# Patient Record
Sex: FEMALE | Race: BLACK OR AFRICAN AMERICAN | Employment: OTHER | URBAN - METROPOLITAN AREA
[De-identification: names, ages, dates, MRNs, and addresses within clinical notes are randomized per-mention and may not be internally consistent; named-entity substitution may affect disease eponyms.]

---

## 2017-04-05 ENCOUNTER — HOSPITAL ENCOUNTER (OUTPATIENT)
Dept: GENERAL RADIOLOGY | Age: 77
Discharge: HOME | End: 2017-04-05
Payer: MEDICARE

## 2017-04-05 DIAGNOSIS — Z01.818 PREOP TESTING: ICD-10-CM

## 2017-04-05 PROCEDURE — 71020 XR CHEST PA LAT: CPT

## 2018-05-14 ENCOUNTER — HOSPITAL ENCOUNTER (OUTPATIENT)
Dept: GENERAL RADIOLOGY | Age: 78
Discharge: HOME | End: 2018-05-14
Payer: MEDICARE

## 2018-05-14 DIAGNOSIS — I50.22 CHRONIC SYSTOLIC HEART FAILURE (HCC): ICD-10-CM

## 2018-05-14 PROCEDURE — 71046 X-RAY EXAM CHEST 2 VIEWS: CPT

## 2018-10-25 ENCOUNTER — APPOINTMENT (OUTPATIENT)
Dept: NUTRITION | Age: 78
End: 2018-10-25

## 2018-10-31 ENCOUNTER — APPOINTMENT (OUTPATIENT)
Dept: NUTRITION | Age: 78
End: 2018-10-31

## 2019-12-12 ENCOUNTER — HOSPITAL ENCOUNTER (OUTPATIENT)
Dept: LAB | Age: 79
Discharge: HOME | End: 2019-12-12
Payer: MEDICARE

## 2019-12-12 LAB
ALBUMIN SERPL-MCNC: 3.5 G/DL (ref 3.4–5)
ANION GAP SERPL CALC-SCNC: 6 MMOL/L (ref 3–18)
APPEARANCE UR: CLEAR
BACTERIA URNS QL MICRO: ABNORMAL /HPF
BILIRUB UR QL: NEGATIVE
BUN SERPL-MCNC: 25 MG/DL (ref 7–18)
BUN/CREAT SERPL: 17 (ref 12–20)
CALCIUM SERPL-MCNC: 9.1 MG/DL (ref 8.5–10.1)
CALCIUM SERPL-MCNC: 9.4 MG/DL (ref 8.5–10.1)
CHLORIDE SERPL-SCNC: 109 MMOL/L (ref 100–111)
CO2 SERPL-SCNC: 28 MMOL/L (ref 21–32)
COLOR UR: YELLOW
CREAT SERPL-MCNC: 1.47 MG/DL (ref 0.6–1.3)
EPITH CASTS URNS QL MICRO: ABNORMAL /LPF (ref 0–5)
ERYTHROCYTE [DISTWIDTH] IN BLOOD BY AUTOMATED COUNT: 14.7 % (ref 11.6–14.5)
GLUCOSE SERPL-MCNC: 85 MG/DL (ref 74–99)
GLUCOSE UR STRIP.AUTO-MCNC: NEGATIVE MG/DL
HCT VFR BLD AUTO: 36.9 % (ref 35–45)
HGB BLD-MCNC: 11.4 G/DL (ref 12–16)
HGB UR QL STRIP: NEGATIVE
KETONES UR QL STRIP.AUTO: NEGATIVE MG/DL
LEUKOCYTE ESTERASE UR QL STRIP.AUTO: ABNORMAL
MAGNESIUM SERPL-MCNC: 2.1 MG/DL (ref 1.6–2.6)
MCH RBC QN AUTO: 27.3 PG (ref 24–34)
MCHC RBC AUTO-ENTMCNC: 30.9 G/DL (ref 31–37)
MCV RBC AUTO: 88.3 FL (ref 74–97)
NITRITE UR QL STRIP.AUTO: POSITIVE
PH UR STRIP: 5.5 [PH] (ref 5–8)
PHOSPHATE SERPL-MCNC: 2.8 MG/DL (ref 2.5–4.9)
PLATELET # BLD AUTO: 222 K/UL (ref 135–420)
PMV BLD AUTO: 10.7 FL (ref 9.2–11.8)
POTASSIUM SERPL-SCNC: 4.4 MMOL/L (ref 3.5–5.5)
PROT UR STRIP-MCNC: 300 MG/DL
PTH-INTACT SERPL-MCNC: 122.1 PG/ML (ref 18.4–88)
RBC # BLD AUTO: 4.18 M/UL (ref 4.2–5.3)
RBC #/AREA URNS HPF: ABNORMAL /HPF (ref 0–5)
SODIUM SERPL-SCNC: 143 MMOL/L (ref 136–145)
SP GR UR REFRACTOMETRY: 1.01 (ref 1–1.03)
UROBILINOGEN UR QL STRIP.AUTO: 0.2 EU/DL (ref 0.2–1)
WBC # BLD AUTO: 5 K/UL (ref 4.6–13.2)
WBC URNS QL MICRO: ABNORMAL /HPF (ref 0–4)

## 2019-12-12 PROCEDURE — 87086 URINE CULTURE/COLONY COUNT: CPT

## 2019-12-12 PROCEDURE — 87186 SC STD MICRODIL/AGAR DIL: CPT

## 2019-12-12 PROCEDURE — 83735 ASSAY OF MAGNESIUM: CPT

## 2019-12-12 PROCEDURE — 36415 COLL VENOUS BLD VENIPUNCTURE: CPT

## 2019-12-12 PROCEDURE — 85027 COMPLETE CBC AUTOMATED: CPT

## 2019-12-12 PROCEDURE — 81001 URINALYSIS AUTO W/SCOPE: CPT

## 2019-12-12 PROCEDURE — 87077 CULTURE AEROBIC IDENTIFY: CPT

## 2019-12-12 PROCEDURE — 83970 ASSAY OF PARATHORMONE: CPT

## 2019-12-12 PROCEDURE — 80069 RENAL FUNCTION PANEL: CPT

## 2019-12-14 LAB
BACTERIA SPEC CULT: ABNORMAL
SERVICE CMNT-IMP: ABNORMAL

## 2020-01-20 ENCOUNTER — HOSPITAL ENCOUNTER (OUTPATIENT)
Dept: GENERAL RADIOLOGY | Age: 80
Discharge: HOME | End: 2020-01-20
Payer: MEDICARE

## 2020-01-20 DIAGNOSIS — R07.9 CHEST PAIN, UNSPECIFIED: ICD-10-CM

## 2020-01-20 PROCEDURE — 71046 X-RAY EXAM CHEST 2 VIEWS: CPT

## 2022-04-07 ENCOUNTER — HOME HEALTH ADMISSION (OUTPATIENT)
Dept: HOME HEALTH SERVICES | Facility: HOME HEALTH | Age: 82
End: 2022-04-07
Payer: MEDICARE

## 2022-04-08 ENCOUNTER — HOME CARE VISIT (OUTPATIENT)
Dept: HOME HEALTH SERVICES | Facility: HOME HEALTH | Age: 82
End: 2022-04-08

## 2022-04-10 ENCOUNTER — HOME CARE VISIT (OUTPATIENT)
Dept: SCHEDULING | Facility: HOME HEALTH | Age: 82
End: 2022-04-10
Payer: MEDICARE

## 2022-04-10 PROCEDURE — 400018 HH-NO PAY CLAIM PROCEDURE

## 2022-04-10 PROCEDURE — 3331090001 HH PPS REVENUE CREDIT

## 2022-04-10 PROCEDURE — G0151 HHCP-SERV OF PT,EA 15 MIN: HCPCS

## 2022-04-10 PROCEDURE — 400013 HH SOC

## 2022-04-10 PROCEDURE — 3331090002 HH PPS REVENUE DEBIT

## 2022-04-10 PROCEDURE — G0299 HHS/HOSPICE OF RN EA 15 MIN: HCPCS

## 2022-04-10 NOTE — CASE COMMUNICATION
SN Initial Assessment done 4/10/22. Batsheva with recent hospital stay for CHF/COPD. H/O DM and CABG, HTN, BLOOD sUGAR  MORNING ON INITIAL AS REPORTED. SN/PT/OT all ordered and accepted by patient /caregiver. Patient is Ambulatory with walker.

## 2022-04-10 NOTE — Clinical Note
Nursing Integumentary and Medication Assessment  Question ScoreM 1330  Does patient have a Stasis Ulcer  NO  Does patient have a Surgical Wound    NO     M 2001  Drug Regimen Review  NO   M 2003 Medication Follow-up  N/A  M 2010 High Risk Drug Education YES    Response codes- delete these before sending message    M 1306 Unhealed Pressure Ulcer/Injury at Stage 2 or higher  0= No       Current Number of Stage 1 Pressure Injuries  0     Stage of Most Problematic Unhealed Pressure Ulcer/Injury that is Stageable      N/A- Patient has no pressure ulcers or no stageable pressure ulcers     M 1330 Does this patient have a Stasis Ulcer? 0= No     Surgical Wound  0= No  M 2001 Did a complete drug regimen review identify potential clinically significant medication issues? 0= No- No issues found during review    M 2003 Did the agency contact a physician by midnight of the next calendar day and complete recommended actions for clinically significant medication issues? M 2010 Has the patient/caregiver received instruction on special precautions for all high-risk medications?      1= Yes

## 2022-04-10 NOTE — Clinical Note
CHF. Needs general strengthening and endurance training. Patient lives with daughter Simon Moy at this time but was living by herself in a 3 level house prior to this. Needs stair nego training.

## 2022-04-10 NOTE — HOME HEALTH
Skilled services/Home bound verification:     Skilled Reason for admission/summary of clinical condition: Female patient with diagnosis of CHF and COPD . This patient is homebound for the following reasons Requires considerable and taxing effort to leave the home . Caregiver: daughters Arsenio Butler and Phi Fischer  Caregiver assists with IADL's. Medications reconciled and all medications are available in the home this visit. High risk medication teaching regarding anticoagulants, hyperglycemic agents or opiod narcotics performed (specify) glimeperide for risk of hypoglycemia ,aspirin and plavix for risk of bleeding     Dr. Dean Sees notified of any discrepancies/look a like medications/medication interactions no severe interaction noted . Home health supplies by type and quantity ordered/delivered this visit include: none    Patient education provided this visit to include: HEP, fall prevention, dc planning . Patient level of understanding of education provided: Patient was able to partially teach back HEP     Sharps Education Provided: Clinician instructed patient/CG on proper disposal of sharps: Containers should be made of hard plastic, be puncture-resistant and leakproof,   such as a laundry detergent or bleach bottle.  When the container is ¾ full, it should be sealed with tape and labeled   DO NOT RECYCLE prior to discarding in the regular trash.      Patient response to procedure performed:  Patient needed visual cues for HEP     Home exercise program/Homework provided: patient educated with HEP including seated hip flex, knee extension, hip abduction, hip adduction, ankle PF and DF and ball squeeze x 20 reps x 3 sets daily to improve MMT on BLE to facilitate with improved bed mobility, transfers and gait with AD. patient also educated with deep breathing exercises to be done daily x 10 reps x 3 sets to prevent SOB during activity.  Patient educated with fall prevention technique by decluttering space, proper use of AD and footwear    Pt/Caregiver instructed on plan of care and are agreeable to plan of care at this time. Physician Dr. Cameron Presley notified of patient admission to home health and plan of care including anticipated frequency of 3w1 2w3 for PT   Discharge planning discussed with patient and caregiver. Discharge planning as follows: dc to family with MD supervision when all goals are met . Pt/Caregiver did verbalize understanding of discharge planning. Next MD appointment: 4/11/22  Patient/caregiver encouraged/instructed to keep appointment as lack of follow through with physician appointment could result in discontinuation of home care services for non-compliance. PMHx: CHF    COPD    CKD    HTN    DM    GERD    Chronic Pain   S: R foot pain 6/10 that is managed by rest. Patient also complained of difficulty with gait and stair negotiation   O:Patients Goals= Be able to go back to PLOF  Wound/Incision: location, description, drainage: none   PLOF: Lives in a 3 level house with  6 steps to enter main floor,15 steps to go to 2nd floor bedroom, and 10 steps to go to basement, She lives by herself prior to hospitalization and was independent inall aspects of ADL's and IADL's. Patient currently stays with daughter at this time while recoverig, was using RW for mobility. STRENGTH B hip flexor, extensor, hip abductors, adductors 3+/5, B knee flexor and extensor 3/5  FTSTS 39 seconds  TUG 65 seconds   BALANCE  Tinetti 11/28 high fall risk due to reduced strength on BLE, gait deviation and decreased efficiency of balance reactions. Patient demonstrated poor use of hip and ankle strategy during standing balance activity. Patient requires support from AD at all times for safety and stability. GAIT Patient ambulated with RW x 20 feet with Mod A x1 with noted slow paced, forward stooped posture and decreased bilateral feet clearance.  Patient wa educated with heel to toe gait pattern technique to prevent LOB and falls   BED MOBILITY Patient needed Neftali x1 with bed mobility   TRANSFERS Patient needed Mod A x1 using RW to and from bed, chair and toilet   A: PT evaluation completed with the presence of  sarah Lopez who is one of the primary CG, POC established, Med rec done, HEP established  P:Home Safety eval/recommendations: Home health physical therapy initial evaluation performed. Patient demonstrates decreased strength, balance, and endurance which increases patient's overall fall risk and burden of care. Patient would benefit from home health physical therapy to improve balance, strength, and endurance which would decrease fall risk and allow patient to return to prior level of function once all functional goals or full rehab potential is met. patient educated with HEP including seated hip flex, knee extension, hip abduction, hip adduction, ankle PF and DF and ball squeeze x 20 reps x 3 sets daily to improve MMT on BLE to facilitate with improved bed mobility, transfers and gait with AD. patient also educated with deep breathing exercises to be done daily x 10 reps x 3 sets to prevent SOB during activity.  Patient educated with fall prevention technique by decluttering space, proper use of AD and footwear

## 2022-04-11 VITALS
OXYGEN SATURATION: 97 % | RESPIRATION RATE: 18 BRPM | TEMPERATURE: 97.4 F | DIASTOLIC BLOOD PRESSURE: 70 MMHG | HEART RATE: 76 BPM | SYSTOLIC BLOOD PRESSURE: 130 MMHG

## 2022-04-11 VITALS
SYSTOLIC BLOOD PRESSURE: 130 MMHG | RESPIRATION RATE: 16 BRPM | HEART RATE: 80 BPM | OXYGEN SATURATION: 99 % | TEMPERATURE: 97.8 F | DIASTOLIC BLOOD PRESSURE: 72 MMHG

## 2022-04-11 PROCEDURE — 3331090001 HH PPS REVENUE CREDIT

## 2022-04-11 PROCEDURE — 3331090002 HH PPS REVENUE DEBIT

## 2022-04-11 NOTE — HOME HEALTH
Caregiver involvement:Daughter Calderon is her daughter, she is caregiver, she  Assists with ADLs, Medication management, Transportation to appointments, Meal prep and assists with ambulation. Medications reconciled and all medications are available in the home this visit. Medications  are effective at this time. Home health supplies by type and quantity ordered/delivered this visit include: n/a  Patient is on Plavix and ASA, I discussed the signs of santos and occult bleeding, prevention of bleeding when taking anticoagulants. and when to call MD.       Patient education provided this visit:the importance of regular blood sugar monitoring for good blood sugar control. Pt instructed to follow with diabetic diet- monitoring sugar intake, limiting foods with high sugar content. Patient is a fall risk, went over the need of having someone with hier when ambulating, keep hallways and living areas free of clutter and throw rugs. MEDICATION ADHERENCE IS AN IMPORTANT COMPONENT OF HTN MANAGEMENT. PATIENT STATES THE IMPORTANCE TO TAKE HTN MEDS SAME TIME EACH DAY. Continue a heart Healthy ADA diet. Watch out for high sodium foods, read labels. Observe for signs of infection. Monitor B/P, take meds as ordered and f/u with PCP. Read labels of foods, stay away from high sodium canned foods and processed meats. Discussed the importance of staying well hydrated with water 6-8 glasses a day. I also went over what CHF is and the importance of diet and medication compliance. We discussed the need for daily weights to observe for increase in fluid retention and to call MD if > 2 lb weight gain in 24 hours. Progress toward goals: Patient ambulating with walker, MARIE after ambulating 10-20 feet. Home exercise program: Discussed s/s of infection to monitor for, s/s of UTI, who to report to/when. Instructed cg to notify staff/md/seek tx if complications occur.    Patient instructed to maintain clear pathways in home and to minimize clutter to prevent falls from occurring/minimize fall potential.   Patient needing a well balanced diet with the 5 food groups, patient to increase fiber in diet, fresh fruits and vegetables, whole grains and increase water intake. Maintain healthy low sodium ADA diet, Continue to monitor B/P and Blood sugars, Observe for signs of infection. Work with PT to increase strength and f/u with PCP. I went over the importance of taking all prescriptions as ordered. I discussed how to avoid extra sodium in her diet. We discussed the signs of infection and when to call MD.  We discussed the high risk for falls and ways to prevent falls in the future. We discussed taking B/P daily and keeping a log. We also discussed the need of a heart healthy ADA diet, and the need to change positions frequently. Gaining strength with PT for increased mobility. Dr. Kat English notified of SN Initial Assessment. Continued need for the following skills: Nursing, Physical Therapy and Occupational Therapy    The following discharge planning was discussed with the pt/caregiver: Will discharge from nursing when education is complete and patient is medically stable.

## 2022-04-12 ENCOUNTER — HOME CARE VISIT (OUTPATIENT)
Dept: SCHEDULING | Facility: HOME HEALTH | Age: 82
End: 2022-04-12
Payer: MEDICARE

## 2022-04-12 VITALS
RESPIRATION RATE: 18 BRPM | OXYGEN SATURATION: 98 % | TEMPERATURE: 97.9 F | SYSTOLIC BLOOD PRESSURE: 132 MMHG | HEART RATE: 72 BPM | DIASTOLIC BLOOD PRESSURE: 75 MMHG

## 2022-04-12 VITALS
OXYGEN SATURATION: 96 % | SYSTOLIC BLOOD PRESSURE: 128 MMHG | HEART RATE: 74 BPM | DIASTOLIC BLOOD PRESSURE: 76 MMHG | TEMPERATURE: 97.6 F | RESPIRATION RATE: 18 BRPM

## 2022-04-12 PROCEDURE — 3331090001 HH PPS REVENUE CREDIT

## 2022-04-12 PROCEDURE — G0300 HHS/HOSPICE OF LPN EA 15 MIN: HCPCS

## 2022-04-12 PROCEDURE — G0157 HHC PT ASSISTANT EA 15: HCPCS

## 2022-04-12 PROCEDURE — 3331090002 HH PPS REVENUE DEBIT

## 2022-04-12 NOTE — HOME HEALTH
Skilled reason for visit: COPD, Diabetes, Hypertension, Cardiac Disease, Chronic Kidney Disease, and Anxiety    Caregiver involvement:  Patients daughter Ida Serrano prepares her meals, schedules her appointments, assist with transporting her to appointments, and assist with toileting and bathing if needed. [facility] sends patients medications to her via mail order. Medications reviewed and all medications ARE available in the home this visit. The following education was provided regarding medications: Bumex-Patient/Caregiver was educated on this medication and the purpose of it. Patient/Caregiver verbalized understanding. MD notified of any discrepancies/look a-like medications/medication interactions: NA    Medications are EFFECTIVE at this time. Home health supplies by type and quantity ordered/delivered this visit include: NA    Patient education provided this visit: Patient was educated on items checked in interventions tab. Sharps education provided: Clinician instructed patient/CG on proper disposal of sharps: Containers should be made of hard plastic, be puncture-resistant and leak proof, such as a laundry detergent or bleach bottle.  When the container is ¾ full, it should be sealed with tape and labeled DO NOT RECYCLE prior to discarding in the regular trash.      Patient level of understanding of education provided: Patient verbalized all understanding in interventions tab. Skilled Care Performed this visit: Disease and Medication Education and Management    Patient response to procedure performed: Patient tolerated vital assessment well and no facial grimaces or complaints of pain or discomfort. Patient was sitting on her bed answering question that this nurse was asking her. Agency Progress toward goals:  Agency staff is progressing well with patient as patient verbalizes being able to better manage her disease processes with the education received from home care staff.     Patient's Progress towards personal goals:  Progressing towards all goals in interventions tab. Home exercise program: Patient will start weighing herself daily and record weights in notebook. Continued need for the following skills: Nursing will continue to follow patient until education is understood and able to be verbalized by patient/caregiver. Plan for next visit:Continue Education on Disease Process and Medications    Patient and/or caregiver notified and agrees to changes in the Plan of Care NA      The following discharge planning was discussed with the pt/caregiver: when patient reaches goals and medication is managed, and disease processes are understood patient agrees and understand that discharge will take place.

## 2022-04-12 NOTE — HOME HEALTH
SUBJECTIVE: Patient feels good this morning and had no c/o pain. CAREGIVER INVOLVEMENT/ASSISTANCE NEEDED FOR: Patient's daughter, Hali López, assists with transportation, ADLS, and IADLS. HOME HEALTH SUPPLIES BY TYPE AND QUANTITY ORDERED/DELIVERED THIS VISIT INCLUDE: None needed for this visit. OBJECTIVE:  See interventions. PATIENT RESPONSE TO TREATMENT: After walking, SpO2 = 98% and HR=87 bpm. Patient was short of breath after walking but recovered after resting 1 minute. After compleating seated CHF/COPD Protocol Exs, SpO2=97% and HR=82 bpm.  PATIENT LEVEL OF UNDERSTANDING OF EDUCATION PROVIDED: Patient verbalized understanding on pain management technique, medication, foot care, fall prevention, pressure relief,   ASSESSMENT OF PROGRESS TOWARD GOALS: Patient is progressing toward goals for gait and transfers. Pt previously needed Mod A to ambulate safely but progressed to CGA for gait training today using FWW. Patient ambulated with decreased evelyn, decreased foot clearance, and forward flexed trunk. Gave multiple vc's for pt to lift B foot higher to clear floor safely and to maintain erect posture. Pt needs reinforcement for safety with gait and to improve posture. Pt also previously needed Mod A to perform sit < > stand transfers but progressed to [id number] for transfer training today. Gave vc's for pt to lean forward and to push off using B UE to stand up. Pt needs reinforcement to improve transfer technique. In addition, instructed pt on performing COPD/CHF Protocol Exercises #1-4 for HEP. CONTINUED NEED FOR THE FOLLOWING SKILLS: Gait Training, Transfer Training, Stairs, and Therapeutic Exercises. PLAN FOR NEXT VISIT: Patient will be seen 1w1 2w3 to increase safety with gait, to improve transfer technique, and to increase strength of B LE. DISCHARGE PLANNING DISCUSSED WITH PATIENT/CAREGIVER: Discharge patient to family with MD supervision when all goals are met.  Pt/Caregiver did verbalize understanding of discharge planning.

## 2022-04-13 ENCOUNTER — HOME CARE VISIT (OUTPATIENT)
Dept: SCHEDULING | Facility: HOME HEALTH | Age: 82
End: 2022-04-13
Payer: MEDICARE

## 2022-04-13 VITALS
OXYGEN SATURATION: 96 % | SYSTOLIC BLOOD PRESSURE: 126 MMHG | DIASTOLIC BLOOD PRESSURE: 78 MMHG | HEART RATE: 81 BPM | TEMPERATURE: 97.2 F | RESPIRATION RATE: 16 BRPM

## 2022-04-13 PROCEDURE — 3331090002 HH PPS REVENUE DEBIT

## 2022-04-13 PROCEDURE — G0300 HHS/HOSPICE OF LPN EA 15 MIN: HCPCS

## 2022-04-13 PROCEDURE — 3331090001 HH PPS REVENUE CREDIT

## 2022-04-13 NOTE — HOME HEALTH
Skilled reason for visit: COPD, Diabetes, Hypertension, Cardiac Disease, Chronic Kidney Disease, and Anxiety    Caregiver involvement:  Patients daughter Nayan Looney prepares her meals, schedules her appointments, assist with transporting her to appointments, and assist with toileting and bathing if needed. [facility] sends patients medications to her via mail order. Medications reviewed and all medications ARE available in the home this visit. The following education was provided regarding medications:Amaryl -Patient/Caregiver was educated on this medication and the purpose of it. Patient/Caregiver verbalized understanding. MD notified of any discrepancies/look a-like medications/medication interactions: NA    Medications are EFFECTIVE at this time. Home health supplies by type and quantity ordered/delivered this visit include: NA    Patient education provided this visit: Patient was educated on items checked in interventions tab. Sharps education provided: Clinician instructed patient/CG on proper disposal of sharps: Containers should be made of hard plastic, be puncture-resistant and leak proof, such as a laundry detergent or bleach bottle. When the container is ¾ full, it should be sealed with tape and labeled DO NOT RECYCLE prior to discarding in the regular trash. Patient level of understanding of education provided: Patient verbalized all understanding in interventions tab. Skilled Care Performed this visit: Disease and Medication Education and Management    Patient response to procedure performed: Patient tolerated vital assessment well and no facial grimaces or complaints of pain or discomfort. Patient was sitting on her bed answering question that this nurse was asking her. Agency Progress toward goals:  Agency staff is progressing well with patient as patient verbalizes being able to better manage her disease processes with the education received from home care staff.     Patient's Progress towards personal goals:  Progressing towards all goals in interventions tab. Home exercise program: Patient will elevate her legs and feet throughout the day when sitting up in wheelchair. Continued need for the following skills: Nursing will continue to follow patient until education is understood and able to be verbalized by patient/caregiver. Plan for next visit:Continue Education on Disease Process and Medications    Patient and/or caregiver notified and agrees to changes in the Plan of Care NA      The following discharge planning was discussed with the pt/caregiver: when patient reaches goals and medication is managed, and disease processes are understood patient agrees and understand that discharge will take place.

## 2022-04-14 ENCOUNTER — HOME CARE VISIT (OUTPATIENT)
Dept: SCHEDULING | Facility: HOME HEALTH | Age: 82
End: 2022-04-14
Payer: MEDICARE

## 2022-04-14 VITALS
OXYGEN SATURATION: 97 % | HEART RATE: 77 BPM | SYSTOLIC BLOOD PRESSURE: 145 MMHG | DIASTOLIC BLOOD PRESSURE: 77 MMHG | RESPIRATION RATE: 18 BRPM | TEMPERATURE: 98.3 F

## 2022-04-14 PROCEDURE — G0157 HHC PT ASSISTANT EA 15: HCPCS

## 2022-04-14 PROCEDURE — 3331090002 HH PPS REVENUE DEBIT

## 2022-04-14 PROCEDURE — 3331090001 HH PPS REVENUE CREDIT

## 2022-04-15 PROCEDURE — 3331090001 HH PPS REVENUE CREDIT

## 2022-04-15 PROCEDURE — 3331090002 HH PPS REVENUE DEBIT

## 2022-04-15 NOTE — HOME HEALTH
SUBJECTIVE: Patient feels good this morning and had no c/o pain. CAREGIVER INVOLVEMENT/ASSISTANCE NEEDED FOR: Patient's daughter, Sher Denise, assists with transportation, ADLS, and IADLS. HOME HEALTH SUPPLIES BY TYPE AND QUANTITY ORDERED/DELIVERED THIS VISIT INCLUDE: None needed for this visit. OBJECTIVE:  See interventions. PATIENT RESPONSE TO TREATMENT: After walking, SpO2 = 98% and HR=80 bpm. Patient was short of breath after walking but recovered after resting in her wheelchair for 45 seconds. After completing CHF/COPD Protocol Exs 1-10, SpO2=98% and HR=77 bpm.  PATIENT LEVEL OF UNDERSTANDING OF EDUCATION PROVIDED: Patient verbalized understanding on pain management technique, medication, foot care, fall prevention, pressure relief,   ASSESSMENT OF PROGRESS TOWARD GOALS: Patient is progressing toward goals for gait and transfers. Pt previously needed CGA to ambulate safely but progressed to SBA for gait training today using FWW. Patient ambulated with decreased evelyn, decreased foot clearance, and forward flexed trunk. Gave multiple vc's for pt to lift B foot higher to clear floor safely and to maintain erect posture. Pt needs reinforcement for safety with gait and to improve posture. Pt also previously needed CGA to perform sit < > stand transfers but progressed to SBA for transfer training today from her wheelchair. Gave vc's for pt to lean forward and to push off using B UE to stand up. Pt needs reinforcement to improve transfer technique. In addition, instructed pt on performing COPD/CHF Protocol Exercises #1-10 for HEP. Patient and pt's daughter verbalized understanding for HEP. CONTINUED NEED FOR THE FOLLOWING SKILLS: Gait Training, Transfer Training, Stairs, and Therapeutic Exercises. PLAN FOR NEXT VISIT: Patient will be seen 2w3 to increase safety with gait, to improve transfer technique, and to increase strength of B LE.   DISCHARGE PLANNING DISCUSSED WITH PATIENT/CAREGIVER: Discharge patient to family with MD supervision when all goals are met. Pt/Caregiver did verbalize understanding of discharge planning.

## 2022-04-16 ENCOUNTER — HOME CARE VISIT (OUTPATIENT)
Dept: HOME HEALTH SERVICES | Facility: HOME HEALTH | Age: 82
End: 2022-04-16
Payer: MEDICARE

## 2022-04-16 PROCEDURE — 3331090002 HH PPS REVENUE DEBIT

## 2022-04-16 PROCEDURE — 3331090001 HH PPS REVENUE CREDIT

## 2022-04-17 PROCEDURE — 3331090002 HH PPS REVENUE DEBIT

## 2022-04-17 PROCEDURE — 3331090001 HH PPS REVENUE CREDIT

## 2022-04-18 ENCOUNTER — HOME CARE VISIT (OUTPATIENT)
Dept: SCHEDULING | Facility: HOME HEALTH | Age: 82
End: 2022-04-18
Payer: MEDICARE

## 2022-04-18 VITALS
TEMPERATURE: 97.5 F | WEIGHT: 164.6 LBS | DIASTOLIC BLOOD PRESSURE: 64 MMHG | BODY MASS INDEX: 31.1 KG/M2 | HEART RATE: 71 BPM | SYSTOLIC BLOOD PRESSURE: 122 MMHG | RESPIRATION RATE: 16 BRPM | OXYGEN SATURATION: 95 %

## 2022-04-18 PROCEDURE — G0300 HHS/HOSPICE OF LPN EA 15 MIN: HCPCS

## 2022-04-18 PROCEDURE — G0152 HHCP-SERV OF OT,EA 15 MIN: HCPCS

## 2022-04-18 PROCEDURE — 3331090002 HH PPS REVENUE DEBIT

## 2022-04-18 PROCEDURE — 3331090001 HH PPS REVENUE CREDIT

## 2022-04-18 NOTE — HOME HEALTH
Skilled reason for visit: COPD, Diabetes, Hypertension, Cardiac Disease, Chronic Kidney Disease, and Anxiety    Caregiver involvement:  Patients daughter Bill Castro prepares her meals, schedules her appointments, assist with transporting her to appointments, and assist with toileting and bathing if needed. [facility] sends patients medications to her via mail order. Medications reviewed and all medications ARE available in the home this visit. The following education was provided regarding medications:Reglan-Patient/Caregiver was educated on this medication and the purpose of it. Patient/Caregiver verbalized understanding. MD notified of any discrepancies/look a-like medications/medication interactions: NA    Medications are EFFECTIVE at this time. Home health supplies by type and quantity ordered/delivered this visit include: NA    Patient education provided this visit: Patient was educated on items checked in interventions tab. Sharps education provided: Clinician instructed patient/CG on proper disposal of sharps: Containers should be made of hard plastic, be puncture-resistant and leak proof, such as a laundry detergent or bleach bottle. When the container is ¾ full, it should be sealed with tape and labeled DO NOT RECYCLE prior to discarding in the regular trash. Patient level of understanding of education provided: Patient verbalized all understanding in interventions tab. Skilled Care Performed this visit: Disease and Medication Education and Management    Patient response to procedure performed: Patient tolerated vital assessment well and no facial grimaces or complaints of pain or discomfort. Patient was sitting on her bed answering question that this nurse was asking her. Agency Progress toward goals:  Agency staff is progressing well with patient as patient verbalizes being able to better manage her disease processes with the education received from home care staff.     Patient's Progress towards personal goals:  Progressing towards all goals in interventions tab. Home exercise program: Patient will be sure to weigh first think on the morning after waking up and using the bathroom, but before eating breakfast and bathing. Patient and daughter verbalized understanding. Continued need for the following skills: Nursing will continue to follow patient until education is understood and able to be verbalized by patient/caregiver. Plan for next visit:Continue Education on Disease Process and Medications    Patient and/or caregiver notified and agrees to changes in the Plan of Care NA      The following discharge planning was discussed with the pt/caregiver: when patient reaches goals and medication is managed, and disease processes are understood patient agrees and understand that discharge will take place.

## 2022-04-18 NOTE — Clinical Note
dx: CHF/COPD, Anemia, zip: [phone], freq: 2w3, 1w1, focus: increased independence ADL tasks - pt would like to return home where she was living alone and performing all ADL tasks with I, increased safety ambulation, transfers, improved balance for fall prevention, independence light meal prep, increased functional endurance. thank you.

## 2022-04-19 ENCOUNTER — HOME CARE VISIT (OUTPATIENT)
Dept: SCHEDULING | Facility: HOME HEALTH | Age: 82
End: 2022-04-19
Payer: MEDICARE

## 2022-04-19 VITALS
SYSTOLIC BLOOD PRESSURE: 153 MMHG | TEMPERATURE: 98 F | OXYGEN SATURATION: 97 % | RESPIRATION RATE: 16 BRPM | HEART RATE: 70 BPM | DIASTOLIC BLOOD PRESSURE: 80 MMHG

## 2022-04-19 VITALS
OXYGEN SATURATION: 97 % | SYSTOLIC BLOOD PRESSURE: 107 MMHG | RESPIRATION RATE: 16 BRPM | DIASTOLIC BLOOD PRESSURE: 60 MMHG | HEART RATE: 74 BPM | TEMPERATURE: 98.9 F

## 2022-04-19 PROCEDURE — 3331090001 HH PPS REVENUE CREDIT

## 2022-04-19 PROCEDURE — G0157 HHC PT ASSISTANT EA 15: HCPCS

## 2022-04-19 PROCEDURE — 3331090002 HH PPS REVENUE DEBIT

## 2022-04-19 NOTE — HOME HEALTH
Clinical Condition per EPIC:  \"List of Comorbidities:   CHF   COPD   CKD   HTN   DM   GERD   Chronic Pain\"  . SUBJECTIVE:  Pt stated \"My foot hurts me but I think It's because of the weather. \" pt sitting WC upon OT arrival. pt agreed tx. pt currently staying at daughters house however reports would like to return home. CAREGIVER INVOLVEMENT: pt daughter provides A ADL tasks, ambulation, and transfers PRN, performs IADL tasks, shopping, transportation, and A with medications. MEDICATION RECONCILIATION: OT reconcilled medications with pt with no changes   DME ORDERED/RECOMMENEDED: NA, pt has FWW, BSC, WC, and adjustable bed. .  OBJECTIVE:  BATHING: pt performs bathing sitting BSC at bathroom sink with mod A per daughter A due to impaired balance. TOILETING: min A   UB DRESSING: min A  LB DRESSING: mod A due to impaired balance  GROOMING: SBA for safety while standing   FEEDING: I  .  pt reports Modified Sunny RPE 5/10 after performing ambulation, transfers, and ADL assessment. .  OT instructed/demonstrated pt the following with good understanding:    - energy conservation while performing bathing, dressing, and setup such as set clothing out night before, gather items required to perform task in one trip, sit while performing tasks, take rest breaks as needed, perform shower/bathing on days with no other appointments or activities scheduled, etc.     - pt is to perform bathing/dressing tasks sitting, when possible, for safety/fall prevention.     - while sitting perform weight shift technique bringing LE contra laterally onto opposite LE while performing LB bathing/dressing for safety and fall prevention. 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 - pt instructed/demonstrated one handed technique while standing to perform functional tasks with use rollator walker/grab bar for increased stability, fall prevention, and safety while standing.     -ADL training performed for improved body mechanics, safety, and technique for reduced risk of falls and improved functional level and participation of tasks. Jamal Wheatlo IADL: NT. pt would like to address light meal prep POC. Jamal Castaneda BALANCE:  STATIC STANDING: fair-  DYNAMIC STANDING: poor+  . AMBULATION: pt performed ambulation FWW min v/c increased foot clearance, toe height, and stride length for safety/fall prevention. pt with fair follow through. .  EOB/BED TRANSFER: pt supine to/from sit with HOB elevated SBA, sit/stand EOB SBA min v/c technique and positioning on bed prior to performing transfer for safety. WC: SBA use BUE push off and reach back prior to sitting for safety/fall prevention. TOILET/BSC from over commode: SBA min v/c technique and use BUE reach back for safety/fall prevention. TUB SHOWER: NT. pt did not perform bathing via tub shower prior to illness therefore declined transfer. .  -gait and transfer training performed for improved body mechanics and technique for fall prevention and safety while performing ADL/IADL tasks. Jamal Castaneda PATIENT RESPONSE TO TREATMENT:  pt reports no increased pain or discomfort with activity throughout tx. PATIENT EDUCATION PROVIDED THIS VISIT: UB HEP, OT role, energy conservation, fall prevention/safety training ADL/IADL tasks, transfer training   . PATIENT LEVEL OF UNDERSTANDING OF EDUCATION PROVIDED: pt verbalized good understanding energy conservation, UB HEP, and transfer techniques. REHAB POTENTIAL: good for stated goals   HOME EXERCISE PROGRAM: Written HEP of the following issued. WAN will instruct/re-instruct pt next treatment. UB HEP includes the following: BUE shoulder press, shoulder flexion, shoulder abduction, shoulder extension, chest press, elbow flexion/extension x 10, x 2 per day. pt aware WAN will update HEP throughout POC.  UB HEP performed for pt increased endurance and strength to perform ADL/IADL tasks and ambulation/transfers to perform tasks with improved safety, increased functional level, and for fall prevention. CONTINUED NEED FOR THE FOLLOWING SKILLS: HH OT is medically necessary to address pain, decreased functional strength, increased swelling, impaired bed mobility to perform ADL tasks, decreased independence and safety with functional transfers, decreased independence and safety performing ADL/IADL tasks, decreased activity and standing tolerance, decreased functional endurance, and impaired balance in order to improve functional independence, obtain set goals, reduce risk of falls, reduce pain, improve quality of life, and return to PLOF. ASSESSMENT: pt presents with decreased endurance, decreased functional strength, decreased safety transfers, increased A with transfers, decreased safety/participation ADL/IADL tasks, decreased balance, increased burden of care, and no UB HEP. PLAN: pt will be seen to address ther ex: establish and upgrade HEP, ther act: activity and standing tolerance training, endurance training, balance training, safety training, energy conservation training, transfer training, ADL/IADL training, and pt/caregiver education. DISCHARGE PLANNING DISCUSSED WITH PT/CAREGIVER: Anticipate D/C skilled OT week of 5.9.22 when goals met or when pt obtained maximum benefit. pt frequency 2w3, 1w1. discharge to self and family care under MD supervision once all goals have been met or patient has reached max potential. pt and caregiver verbalized understanding and agree POC.

## 2022-04-20 PROCEDURE — 3331090001 HH PPS REVENUE CREDIT

## 2022-04-20 PROCEDURE — 3331090002 HH PPS REVENUE DEBIT

## 2022-04-21 ENCOUNTER — HOME CARE VISIT (OUTPATIENT)
Dept: SCHEDULING | Facility: HOME HEALTH | Age: 82
End: 2022-04-21
Payer: MEDICARE

## 2022-04-21 VITALS
HEART RATE: 79 BPM | DIASTOLIC BLOOD PRESSURE: 67 MMHG | RESPIRATION RATE: 18 BRPM | SYSTOLIC BLOOD PRESSURE: 130 MMHG | TEMPERATURE: 98.2 F | OXYGEN SATURATION: 99 %

## 2022-04-21 VITALS
OXYGEN SATURATION: 97 % | WEIGHT: 167.4 LBS | RESPIRATION RATE: 18 BRPM | BODY MASS INDEX: 31.63 KG/M2 | SYSTOLIC BLOOD PRESSURE: 122 MMHG | HEART RATE: 87 BPM | DIASTOLIC BLOOD PRESSURE: 80 MMHG | TEMPERATURE: 97.5 F

## 2022-04-21 PROCEDURE — 3331090001 HH PPS REVENUE CREDIT

## 2022-04-21 PROCEDURE — G0157 HHC PT ASSISTANT EA 15: HCPCS

## 2022-04-21 PROCEDURE — G0300 HHS/HOSPICE OF LPN EA 15 MIN: HCPCS

## 2022-04-21 PROCEDURE — 3331090002 HH PPS REVENUE DEBIT

## 2022-04-21 NOTE — HOME HEALTH
SUBJECTIVE: Patient reported that she has been walking around a lot but has not been exercising. CAREGIVER INVOLVEMENT/ASSISTANCE NEEDED FOR: Patient's daughter, Grady Bennett, assists with transportation, ADLS, and IADLS. HOME HEALTH SUPPLIES BY TYPE AND QUANTITY ORDERED/DELIVERED THIS VISIT INCLUDE: None needed for this visit. OBJECTIVE:  See interventions. PATIENT RESPONSE TO TREATMENT: Pt was short of breath after walking but SpO2=98% and HR=86 bpm. After bed mobility training, SpO2=98% and HR=82 bpm.  PATIENT LEVEL OF UNDERSTANDING OF EDUCATION PROVIDED: Patient verbalized understanding on pain management technique, medication, diabetic foot care, fall prevention, and pressure relief. ASSESSMENT OF PROGRESS TOWARD GOALS: Patient is progressing toward goals for gait and bed mobility. Pt previously needed SBA to ambulate safely but progressed to close S for gait training today using FWW. Patient ambulated with decreased evelyn, decreased foot clearance, and forward flexed trunk. Gave multiple vc's for pt to lift B foot higher to clear floor safely and to maintain erect posture. Pt needs reinforcement for safety with gait and to improve posture. Pt also previously needed Min A to get in and out of bed and to scoot L < > R in bed but progressed to I for bed mobility training today. Pt needed extra time and was short of breath but demonstrated good technique for bed mobility. In addition, instructed pt on performing CHF/COPD Protocol Exercises #1-14 except #13. CONTINUED NEED FOR THE FOLLOWING SKILLS: Gait Training, Transfer Training, Stairs, and Therapeutic Exercises. PLAN FOR NEXT VISIT: Patient will be seen 2w2 to increase safety with gait, to improve transfer technique, and to increase strength of B LE. DISCHARGE PLANNING DISCUSSED WITH PATIENT/CAREGIVER: Discharge patient to family with MD supervision when all goals are met. Pt/Caregiver did verbalize understanding of discharge planning.

## 2022-04-21 NOTE — HOME HEALTH
Skilled reason for visit: COPD, Diabetes, Hypertension, Cardiac Disease, Chronic Kidney Disease, and Anxiety    Caregiver involvement:  Patients daughter Lidia Bradley prepares her meals, schedules her appointments, assist with transporting her to appointments, and assist with toileting and bathing if needed. [facility]ila sends patients medications to her via mail order. Medications reviewed and all medications ARE available in the home this visit. The following education was provided regarding medications: Synthroid-Patient/Caregiver was educated on this medication and the purpose of it. Patient/Caregiver verbalized understanding. MD notified of any discrepancies/look a-like medications/medication interactions: NA    Medications are EFFECTIVE at this time. Home health supplies by type and quantity ordered/delivered this visit include: NA    Patient education provided this visit: Patient was educated on items checked in interventions tab. Sharps education provided: Clinician instructed patient/CG on proper disposal of sharps: Containers should be made of hard plastic, be puncture-resistant and leak proof, such as a laundry detergent or bleach bottle. When the container is ¾ full, it should be sealed with tape and labeled DO NOT RECYCLE prior to discarding in the regular trash. Patient level of understanding of education provided: Patient verbalized all understanding in interventions tab. Skilled Care Performed this visit: Disease and Medication Education and Management    Patient response to procedure performed: Patient tolerated vital assessment well and no facial grimaces or complaints of pain or discomfort. Patient was sitting on her bed answering question that this nurse was asking her. Agency Progress toward goals:  Agency staff is progressing well with patient as patient verbalizes being able to better manage her disease processes with the education received from home care staff.     Patient's Progress towards personal goals:  Progressing towards all goals in interventions tab. Home exercise program: Patient will drink plenty of fluids to stay hydrated and prevent dehydration. .    Continued need for the following skills: Nursing will continue to follow patient until education is understood and able to be verbalized by patient/caregiver. Plan for next visit:Continue Education on Disease Process and Medications    Patient and/or caregiver notified and agrees to changes in the Plan of Care NA      The following discharge planning was discussed with the pt/caregiver: when patient reaches goals and medication is managed, and disease processes are understood patient agrees and understand that discharge will take place.

## 2022-04-22 ENCOUNTER — HOME CARE VISIT (OUTPATIENT)
Dept: SCHEDULING | Facility: HOME HEALTH | Age: 82
End: 2022-04-22
Payer: MEDICARE

## 2022-04-22 VITALS
DIASTOLIC BLOOD PRESSURE: 66 MMHG | SYSTOLIC BLOOD PRESSURE: 138 MMHG | TEMPERATURE: 97.4 F | HEART RATE: 73 BPM | OXYGEN SATURATION: 95 %

## 2022-04-22 VITALS
RESPIRATION RATE: 18 BRPM | OXYGEN SATURATION: 96 % | HEART RATE: 68 BPM | BODY MASS INDEX: 31.37 KG/M2 | TEMPERATURE: 97.6 F | DIASTOLIC BLOOD PRESSURE: 64 MMHG | SYSTOLIC BLOOD PRESSURE: 118 MMHG | WEIGHT: 166 LBS

## 2022-04-22 PROCEDURE — G0158 HHC OT ASSISTANT EA 15: HCPCS

## 2022-04-22 PROCEDURE — 3331090002 HH PPS REVENUE DEBIT

## 2022-04-22 PROCEDURE — 3331090001 HH PPS REVENUE CREDIT

## 2022-04-22 PROCEDURE — G0300 HHS/HOSPICE OF LPN EA 15 MIN: HCPCS

## 2022-04-22 NOTE — HOME HEALTH
Skilled reason for visit: COPD, Diabetes, Hypertension, Cardiac Disease, Chronic Kidney Disease, and Anxiety    Caregiver involvement:  Patients daughter Alejandro Vines prepares her meals, schedules her appointments, assist with transporting her to appointments, and assist with toileting and bathing if needed. [facility] sends patients medications to her via mail order. Medications reviewed and all medications ARE available in the home this visit. The following education was provided regarding medications: Isordil-Patient/Caregiver was educated on this medication and the purpose of it. Patient/Caregiver verbalized understanding. MD notified of any discrepancies/look a-like medications/medication interactions: NA    Medications are EFFECTIVE at this time. Home health supplies by type and quantity ordered/delivered this visit include: NA    Patient education provided this visit: Patient was educated on items checked in interventions tab. Sharps education provided: Clinician instructed patient/CG on proper disposal of sharps: Containers should be made of hard plastic, be puncture-resistant and leak proof, such as a laundry detergent or bleach bottle. When the container is ¾ full, it should be sealed with tape and labeled DO NOT RECYCLE prior to discarding in the regular trash. Patient level of understanding of education provided: Patient verbalized all understanding in interventions tab. Skilled Care Performed this visit: Disease and Medication Education and Management    Patient response to procedure performed: Patient tolerated vital assessment well and no facial grimaces or complaints of pain or discomfort. Patient was sitting in her wheelchair in the living room answering questions that this nurse was asking her.     Agency Progress toward goals:  Agency staff is progressing well with patient as patient verbalizes being able to better manage her disease processes with the education received from home care staff. Patient's Progress towards personal goals:  Progressing towards all goals in interventions tab. Home exercise program: Patient will continue to weigh and monitor blood sugars as ordered as she has been doing great with keeping a record of readings. Continued need for the following skills: Nursing will continue to follow patient until education is understood and able to be verbalized by patient/caregiver. Plan for next visit:Continue Education on Disease Process and Medications    Patient and/or caregiver notified and agrees to changes in the Plan of Care NA      The following discharge planning was discussed with the pt/caregiver: when patient reaches goals and medication is managed, and disease processes are understood patient agrees and understand that discharge will take place.

## 2022-04-22 NOTE — HOME HEALTH
SUBJECTIVE: Upon arrival pt was seated in wc; reported no pain and recently finished breakfast. Pt stated she stays with her daughter and would like to return home once she is stronger beacuse she wants to be independent at home. Pt daughter reported she does not bathe in tub perfers sponge bathing at sink sitting on 3in1. Pt reported she can dress her self including performing jabari care but her daughter does it for her because she is \"too slow\" performing tasks; pt reported she makes her own coffee and her daughter prepares her meals. 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 CAREGIVER ASSISTANCE NEEDED FOR: Pts daughter present at time of treatment for education and to assist with pts care. MEDICATIONS REVIEWED AND UPDATED: No changes in medications reported at time of visit. .  OBJECTIVE: see interventions  PATIENT RESPONSE TO TREATMENT:  Pt demonstrated a positive response to treatment with no increase in pain and good participation during HEP and transfers. Delphine Napier PATIENT/CAREGIVER EDUCATION PROVIDED THIS VISIT: CG educated on nayana technique for LB dressing to increased I and safety; safety in bathroom to not use door knob but instead to use sink counter and walk laterally until door frame; to increase safety with transfer from sit<>  bed to place FWW closer to bed. PATIENT LEVEL OF UNDERSTANDING OF EDUCATION PROVIDED: Pt demonstrated good understanding of education by verbalizing teachback of nayana technique for LB dressing, teachback with exiting the bathroom holding countertop and verbalizing she will move clothing basket to place FWW closer to bed. ASSESSMENT AND PROGRESS TOWARD GOALS: Pt demonstrated good progress towards goals with increasing safety and I with good demonstration of transfers, standing balance and HEP. Patient will benefit from continued intervention with progression of I/ADLs, functional mobility and transfers.    CONTINUED NEED FOR THE FOLLOWING SKILLS: HH OT is medically necessary to address decreased ROM, decreased strength, decreased independence with functional transfers, decreased I with I/ADLs, and impaired balance in order to improve functional independence, quality of life, return to PLOF, reduce the risk for falls, and reduce pain. PLAN: Next visit will address pt bathing and clothing management. DISCHARGE PLANNING DISCUSSED: Discharge to self and family under MD supervision once all goals have been met or patient has reached maximum potential.  Frequency remaininw2, 1w1 remaining.

## 2022-04-23 PROCEDURE — 3331090002 HH PPS REVENUE DEBIT

## 2022-04-23 PROCEDURE — 3331090001 HH PPS REVENUE CREDIT

## 2022-04-24 PROCEDURE — 3331090002 HH PPS REVENUE DEBIT

## 2022-04-24 PROCEDURE — 3331090001 HH PPS REVENUE CREDIT

## 2022-04-25 ENCOUNTER — HOME CARE VISIT (OUTPATIENT)
Dept: SCHEDULING | Facility: HOME HEALTH | Age: 82
End: 2022-04-25
Payer: MEDICARE

## 2022-04-25 VITALS
DIASTOLIC BLOOD PRESSURE: 68 MMHG | SYSTOLIC BLOOD PRESSURE: 128 MMHG | OXYGEN SATURATION: 95 % | WEIGHT: 166 LBS | HEART RATE: 78 BPM | TEMPERATURE: 97.4 F | BODY MASS INDEX: 31.37 KG/M2 | RESPIRATION RATE: 16 BRPM

## 2022-04-25 VITALS
RESPIRATION RATE: 20 BRPM | SYSTOLIC BLOOD PRESSURE: 137 MMHG | DIASTOLIC BLOOD PRESSURE: 65 MMHG | HEART RATE: 68 BPM | TEMPERATURE: 97.4 F | OXYGEN SATURATION: 97 %

## 2022-04-25 PROCEDURE — 3331090002 HH PPS REVENUE DEBIT

## 2022-04-25 PROCEDURE — G0157 HHC PT ASSISTANT EA 15: HCPCS

## 2022-04-25 PROCEDURE — G0300 HHS/HOSPICE OF LPN EA 15 MIN: HCPCS

## 2022-04-25 PROCEDURE — 3331090001 HH PPS REVENUE CREDIT

## 2022-04-25 NOTE — HOME HEALTH
SUBJECTIVE: Patient reported poor follow-up with HEP. CAREGIVER INVOLVEMENT/ASSISTANCE NEEDED FOR: Patient's daughter, Valdemar Brittle, assists with transportation, ADLS, and IADLS. HOME HEALTH SUPPLIES BY TYPE AND QUANTITY ORDERED/DELIVERED THIS VISIT INCLUDE: None needed for this visit. OBJECTIVE:  See interventions. PATIENT RESPONSE TO TREATMENT: Pt was short of breath after walking and exercising, but O2 Saturation stayed WNL. PATIENT LEVEL OF UNDERSTANDING OF EDUCATION PROVIDED: Patient verbalized understanding on pain management technique, medication, diabetic foot care, fall prevention, and pressure relief. ASSESSMENT OF PROGRESS TOWARD GOALS: Patient is progressing toward goals for gait and transfers. Pt previously needed close S to ambulate safely but progressed to Supervision for gait training today using FWW inside her home. Patient ambulated with decreased step length, decreased foot clearance, and forward flexed trunk. Gave multiple vc's for pt to take longer steps, to lift B foot higher to clear floor safely, and to maintain erect posture. Pt needs reinforcement for safety with gait, to improve gait, and to improve posture. Pt also previously needed SBA to perform sit < > stand transfers but progressed to Supervision for transfer training today. Gave vc's for pt to lean forward and to push off using B UE to stand up. Pt demonstrated improved transfer technique after giving verbal cues. In addition, patient performed 1 set of CHF/COPD Protocol Exercises #1-14 except #13. Discussed with patient/CG plan to discharge pt from [facility] PT services next week. Pt/CG verbalized understanding and is in agreement with discharge plan. CONTINUED NEED FOR THE FOLLOWING SKILLS: Gait Training, Transfer Training, Stairs, and Therapeutic Exercises. PLAN FOR NEXT VISIT: Patient will be seen 1w1 2w1 to increase safety with gait, to improve transfer technique, and to increase strength of B LE.   DISCHARGE PLANNING DISCUSSED WITH PATIENT/CAREGIVER: Discharge patient to family with MD supervision when all goals are met. Pt/Caregiver did verbalize understanding of discharge planning.

## 2022-04-26 PROCEDURE — 3331090001 HH PPS REVENUE CREDIT

## 2022-04-26 PROCEDURE — 3331090002 HH PPS REVENUE DEBIT

## 2022-04-27 ENCOUNTER — HOME CARE VISIT (OUTPATIENT)
Dept: SCHEDULING | Facility: HOME HEALTH | Age: 82
End: 2022-04-27
Payer: MEDICARE

## 2022-04-27 VITALS
OXYGEN SATURATION: 98 % | RESPIRATION RATE: 16 BRPM | HEART RATE: 94 BPM | TEMPERATURE: 97.7 F | DIASTOLIC BLOOD PRESSURE: 81 MMHG | SYSTOLIC BLOOD PRESSURE: 159 MMHG

## 2022-04-27 PROCEDURE — G0151 HHCP-SERV OF PT,EA 15 MIN: HCPCS

## 2022-04-27 PROCEDURE — G0158 HHC OT ASSISTANT EA 15: HCPCS

## 2022-04-27 PROCEDURE — 3331090002 HH PPS REVENUE DEBIT

## 2022-04-27 PROCEDURE — 3331090001 HH PPS REVENUE CREDIT

## 2022-04-27 NOTE — HOME HEALTH
SUBJECTIVE: I went to the doctors yesteday and they did not change any of my mediacations. I am going to have a scope on my stomach in May  REQUIRES CAREGIVER ASSISTANCE FOR: transportation, medications, ADLS, IADLS   MEDICATIONS REVIEWED AND RECONCILED no changes   NEXT MD APPT:  22   ROM:B LE WFL   STRENGTH: B hip flexor, quads and hamstrings 5/5  5x sit to stand  29.37 sec   WOUNDS:none  BED MOBILITY:supine to sit and sit to supine MOD I   TRANSFERS:sit to stand from wc x3 MOD I with B UE support MOD , sit to stand from bed x 6 MOD I with and without BUE support for push off. GAIT: Pt amb with RW with S with reciprocal gait pattern with decresed B step length with decreased B HS with fwd flexed posture. O2 sat was 96% after amb with HR of 86. Per PTA visit 22  Patient progressed to ambulating 100' inside her home using FWW Supervision to promote increased safety and improved stability using LRAD when ambulating. Patient ambulated with decreased step length, decreased foot clearance, and forward flexed trunk. Gave multiple vc's for pt to take longer steps, to lift B foot higher to clear floor safely, and to maintain erect posture. Pt needs reinforcement for safety with gait, to improve gait, and to improve posture. STAIRS Per PTA visit 22 Patient progressed to negotiating up and down 4 stairs + 1 using handrail CGA to promote safety when entering and exiting her home in preparation for MD appointments and community ambulation. Pt led with her L LE to go up and down stairs. Pt needed extra time and verbal cues to perform pursed-lip breathing when negotiating stairs. BALANCE: Pt scored 18/28 on Tinetti Balance Assessment placing pt at high  risk for falls.   TU.28  PATIENT RESPONE TO TX: Pt pain level remained the same throughut tx session  PATIENT LEVEL OF UNDERSTANDING OF EDUCATION PROVIDED Pt demonstated and verbalized use of RW at all times when walking for safety   PATIENT EDUCATION PROVIDED THIS VISIT: safety, HEP, walking, deep breathing,      HEP consisting of:  1. Walking every hour during the daytime for 3-5 minutes using FWW.    2. CHF/COPD Protocol Exercises #1-14 (NOT 13 & 15) x 10 reps each, 3x/day. Written HEP issued, patient/caregiver verbalized understanding. CONTINUED NEED FOR THE FOLLOWING SKILLS: HH PT is medically necessary to  address , impaired gait, impaired stair negotiation, and impaired balance in order to improve functional independence, quality of life, return to PLOF, and reduce the risk for falls. ASSESSMENT: Pt has made progress in therapy. On SOC strength was B hip flexor, extensor, hip abductors, adductors 3+/5, B knee flexor and extensor 3/5. FTSTS 39 seconds. Today on Re Assessment strength is  B hip flexor, quads and hamstrings 5/5  5x sit to stand  29.37 sec. On [facility] bed mobility was Patient needed Neftali x1 with bed mobility  Today at Re Assessment bed mobility is supine to sit and sit to supine MOD I. goal has been met. On [facility] transfers were Patient needed Mod A x1 using RW to and from bed, chair and toilet . Today at Re Assessment transfers are sit to stand from wc x3 MOD I with B UE support MOD , sit to stand from bed x 6 MOD I with and without BUE support for push off. goal has been met. On SOC gait was  Patient ambulated with RW x 20 feet with Mod A x1 with noted slow paced, forward stooped posture and decreased bilateral feet clearance. Patient wa educated with heel to toe gait pattern technique to prevent LOB and falls  Today at Re Assessment gait is Pt amb with RW with S with reciprocal gait pattern with decresed B step length with decreased B HS with fwd flexed posture. O2 sat was 96% after amb with HR of 86. Per PTA visit 4/25/22  Patient progressed to ambulating 100' inside her home using FWW Supervision to promote increased safety and improved stability using LRAD when ambulating.  Patient ambulated with decreased step length, decreased foot clearance, and forward flexed trunk. Gave multiple vc's for pt to take longer steps, to lift B foot higher to clear floor safely, and to maintain erect posture. Pt needs reinforcement for safety with gait, to improve gait, and to improve posture. . On SOC stairs were na. Per PTA visit 4/21/22 Patient progressed to negotiating up and down 4 stairs + 1 using handrail CGA to promote safety when entering and exiting her home in preparation for MD appointments and community ambulation. Pt led with her L LE to go up and down stairs. Pt needed extra time and verbal cues to perform pursed-lip breathing when negotiating stairs. .On [facility] Tinetti 11/28 high fall risk due to reduced strength on BLE, gait deviation and decreased efficiency of balance reactions. Patient demonstrated poor use of hip and ankle strategy during standing balance activity. Patient requires support from AD at all times for safety and stability. TUG 65 seconds. Today at Re Assessment  pt scored a 18/28 on Tinetti Balance Assessment placing pt as a high fall risk. Pt did have more than a 4 point statistical improvement. Pt would cont to benefit from HHPT services to cont to work on stair training, gait training on flat and uenven surfaces working on distance and endunace and cont with dynamic standing balance re education all to increased pt functional I and return pt to PLOF   PLAN: stair training, gait training on flat level and uneven surfaces. dynamic standing balance re education  DISCHARGE PLANNING DISCUSSED: Discharge to self and family under MD supervision once all goals have been met or patient has reached max potential. Patient/caregiver verbalized understanding.

## 2022-04-28 ENCOUNTER — HOME CARE VISIT (OUTPATIENT)
Dept: HOME HEALTH SERVICES | Facility: HOME HEALTH | Age: 82
End: 2022-04-28
Payer: MEDICARE

## 2022-04-28 VITALS
WEIGHT: 166 LBS | BODY MASS INDEX: 31.37 KG/M2 | DIASTOLIC BLOOD PRESSURE: 78 MMHG | OXYGEN SATURATION: 97 % | RESPIRATION RATE: 17 BRPM | HEART RATE: 79 BPM | TEMPERATURE: 97.8 F | SYSTOLIC BLOOD PRESSURE: 142 MMHG

## 2022-04-28 VITALS
TEMPERATURE: 97.7 F | HEART RATE: 73 BPM | OXYGEN SATURATION: 97 % | SYSTOLIC BLOOD PRESSURE: 138 MMHG | DIASTOLIC BLOOD PRESSURE: 60 MMHG | RESPIRATION RATE: 16 BRPM

## 2022-04-28 PROCEDURE — 3331090002 HH PPS REVENUE DEBIT

## 2022-04-28 PROCEDURE — G0300 HHS/HOSPICE OF LPN EA 15 MIN: HCPCS

## 2022-04-28 PROCEDURE — 3331090001 HH PPS REVENUE CREDIT

## 2022-04-28 NOTE — HOME HEALTH
SUBJECTIVE: Upon arrival was seated in w/c; pt stated MD appointment went well yesterday and MD plans to schedule for stomach scope in the future. Pt stated since OT last visit she had been performing her bathing at sink level at S level with AE and with rest breaks Pt stated she receives pre-mademeals from [employer] and she warms them up for lunch without any assistance. Pt demonstrated a lack of carryover of HEP and therapist re-educated pt on HEP. CAREGIVER ASSISTANCE NEEDED FOR: pts daughter home during visit and available if needed for education and scheduling. MEDICATIONS REVIEWED AND UPDATED: no medication changes reported this visit. .  OBJECTIVE: see interventions  PATIENT RESPONSE TO TREATMENT:  Pt demonstrated positive response to treatment with no increased pain reported and good participation. Derick Miner PATIENT/CAREGIVER EDUCATION PROVIDED THIS VISIT: Pt re-educated in on HEP to help increase UB strength and endurance. PATIENT LEVEL OF UNDERSTANDING OF EDUCATION PROVIDED: Pt demonstrated an understanding of education with good teachback of of HEP. ASSESSMENT AND PROGRESS TOWARD GOALS:  Pt demonstrated good progress towards goals with increased safety and I with IADLs, transfers and UB HEP. Patient will benefit from continued intervention with progression of I/ADL training and assessment for increased I and use of energy conservation techniques to reduce fatigue. CONTINUED NEED FOR THE FOLLOWING SKILLS: HH OT is medically necessary to address decreased strength, impaired bed mobility, decreased independence with functional transfers, decreased I with I/ADLs, and impaired balance in order to improve functional independence, quality of life, return to PLOF, reduce the risk for falls, and reduce pain.   PLAN: next visit will be for ADL assessment of sponge bathing in bathroom for increased safety and I.    DISCHARGE PLANNING DISCUSSED: Discharge to self and family under MD supervision once all goals have been met or patient has reached maximum potential.  Frequency remaininw1, 2w1,1w1 remaining.

## 2022-04-29 ENCOUNTER — HOME CARE VISIT (OUTPATIENT)
Dept: SCHEDULING | Facility: HOME HEALTH | Age: 82
End: 2022-04-29
Payer: MEDICARE

## 2022-04-29 PROCEDURE — G0158 HHC OT ASSISTANT EA 15: HCPCS

## 2022-04-29 PROCEDURE — 3331090002 HH PPS REVENUE DEBIT

## 2022-04-29 PROCEDURE — 3331090001 HH PPS REVENUE CREDIT

## 2022-04-30 PROCEDURE — 3331090001 HH PPS REVENUE CREDIT

## 2022-04-30 PROCEDURE — 3331090002 HH PPS REVENUE DEBIT

## 2022-05-01 VITALS
DIASTOLIC BLOOD PRESSURE: 46 MMHG | TEMPERATURE: 97.2 F | SYSTOLIC BLOOD PRESSURE: 142 MMHG | HEART RATE: 78 BPM | RESPIRATION RATE: 16 BRPM | OXYGEN SATURATION: 97 %

## 2022-05-01 PROCEDURE — 3331090002 HH PPS REVENUE DEBIT

## 2022-05-01 PROCEDURE — 3331090001 HH PPS REVENUE CREDIT

## 2022-05-01 NOTE — HOME HEALTH
SUBJECTIVE: Upon arrival pt was in the bathroom; once she exited the bathroom pt stated she was tired and did not want to bath this morning as previously arranged. Pt stated no current pain but some chest pain last night. Pt and CG reported pt has been performing her own sponge bath at sink requiring assistance to wash her back, bottom and feet. Pt fatigued during mock bathing rushing through process and returning to bedroom to sit EOB; CG reported pt is usually fatigued after bathing. Pt CG said pt can do her own UB dressing and CG helps with socks and threading pants on feet. Therapist recommended pt performs bathing on 3in1 to help reduce fatigue during bathing. CAREGIVER ASSISTANCE NEEDED FOR: Pts daughter present at time of treatment for education and to assist with pts care. MEDICATIONS REVIEWED AND UPDATED: No changes in medications reported at time of visit. .  OBJECTIVE: see interventions  PATIENT RESPONSE TO TREATMENT:  Pt demonstrated a fair response to treatment with no increase in pain and fair participation of mock ADLs. Tae Neighbours PATIENT/CAREGIVER EDUCATION PROVIDED THIS VISIT: Pt educated on safety techniques to increase I and reduce fatigue during sponge bathing by sitting on 3in1 to perform all bathing and stand when needed; educated on the importance of practicing static stance at [facility] to increase endurance and strength. PATIENT LEVEL OF UNDERSTANDING OF EDUCATION PROVIDED: Pt and CG demonstrated good understanding of education verbalizing pt will sit during ADLs to reduce fatigue and practice static stance during commercial breaks to increase endurance. ASSESSMENT AND PROGRESS TOWARD GOALS: Pt demonstrated good progress towards goals with increased safety and I during transfer. Patient will benefit from continued intervention with progression of ADLs, clothing management and meal prep to increase I and pts goal to return home without help.   CONTINUED NEED FOR THE FOLLOWING SKILLS: [place] OT is medically necessary to address, decreased ROM, decreased strength, decreased independence with functional transfers, decreased I with I/ADLs, and impaired balance in order to improve functional independence, quality of life, return to PLOF, reduce the risk for falls, and reduce pain. PLAN: Next visit will address safety and technique during bathing and dressing. DISCHARGE PLANNING DISCUSSED: Discharge to self and family under MD supervision once all goals have been met or patient has reached maximum potential.  Frequency remaininw1, 1w1 remaining.

## 2022-05-02 ENCOUNTER — HOME CARE VISIT (OUTPATIENT)
Dept: SCHEDULING | Facility: HOME HEALTH | Age: 82
End: 2022-05-02
Payer: MEDICARE

## 2022-05-02 VITALS
OXYGEN SATURATION: 95 % | HEART RATE: 74 BPM | SYSTOLIC BLOOD PRESSURE: 134 MMHG | TEMPERATURE: 98.5 F | DIASTOLIC BLOOD PRESSURE: 62 MMHG | RESPIRATION RATE: 18 BRPM

## 2022-05-02 PROCEDURE — 3331090001 HH PPS REVENUE CREDIT

## 2022-05-02 PROCEDURE — G0157 HHC PT ASSISTANT EA 15: HCPCS

## 2022-05-02 PROCEDURE — 3331090002 HH PPS REVENUE DEBIT

## 2022-05-03 ENCOUNTER — HOME CARE VISIT (OUTPATIENT)
Dept: SCHEDULING | Facility: HOME HEALTH | Age: 82
End: 2022-05-03
Payer: MEDICARE

## 2022-05-03 PROCEDURE — 3331090002 HH PPS REVENUE DEBIT

## 2022-05-03 PROCEDURE — G0158 HHC OT ASSISTANT EA 15: HCPCS

## 2022-05-03 PROCEDURE — 3331090001 HH PPS REVENUE CREDIT

## 2022-05-04 ENCOUNTER — HOME CARE VISIT (OUTPATIENT)
Dept: SCHEDULING | Facility: HOME HEALTH | Age: 82
End: 2022-05-04
Payer: MEDICARE

## 2022-05-04 VITALS
TEMPERATURE: 97.9 F | SYSTOLIC BLOOD PRESSURE: 132 MMHG | RESPIRATION RATE: 16 BRPM | HEART RATE: 91 BPM | OXYGEN SATURATION: 97 % | DIASTOLIC BLOOD PRESSURE: 62 MMHG

## 2022-05-04 VITALS
SYSTOLIC BLOOD PRESSURE: 126 MMHG | RESPIRATION RATE: 12 BRPM | TEMPERATURE: 96.6 F | DIASTOLIC BLOOD PRESSURE: 65 MMHG | OXYGEN SATURATION: 99 % | HEART RATE: 83 BPM

## 2022-05-04 PROCEDURE — 3331090001 HH PPS REVENUE CREDIT

## 2022-05-04 PROCEDURE — 3331090002 HH PPS REVENUE DEBIT

## 2022-05-04 PROCEDURE — G0151 HHCP-SERV OF PT,EA 15 MIN: HCPCS

## 2022-05-04 NOTE — Clinical Note
thank you   ----- Message -----  From: Melinda Jones, PT  Sent: 5/4/2022   3:05 PM EDT  To: RUMA Alfaro/L      Patient is s/p CHF/COPD  and has been treated for  strengthening, gait training, stair training, HEP training, safety training, and balance training. On SOC strength was B hip flexor, extensor, hip abductors, adductors 3+/5, B knee flexor and extensor 3/5. FTSTS 39 seconds. On  Re Assessment 4/27/22  strength is  B hip flexor, quads and hamstrings 5/5  5x sit to stand  29.37 sec. goals has been met. On [facility] bed mobility was Patient needed Neftali x1 with bed mobility  On Re Assessment bed mobility is supine to sit and sit to supine MOD I. goal has been met. On [facility] transfers were Patient needed Mod A x1 using RW to and from bed, chair and toilet . On Re Assessment  transfers are sit to stand from wc x3 MOD I with B UE support MOD , sit to stand from bed x 6 MOD I with and without BUE support for push off. goal has been met. On SOC gait was  Patient ambulated with RW x 20 feet with Mod A x1 with noted slow paced, forward stooped posture and decreased bilateral feet clearance. Patient wa educated with heel to toe gait pattern technique to prevent LOB and falls  On Re Assessment gait is Pt amb with RW with S with reciprocal gait pattern with decresed B step length with decreased B HS with fwd flexed posture. O2 sat was 96% after amb with HR of 86. Per PTA visit 4/25/22  Patient progressed to ambulating 100' inside her home using FWW Supervision to promote increased safety and improved stability using LRAD when ambulating. Patient ambulated with decreased step length, decreased foot clearance, and forward flexed trunk. Gave multiple vc's for pt to take longer steps, to lift B foot higher to clear floor safely, and to maintain erect posture. Pt needs reinforcement for safety with gait, to improve gait, and to improve posture. .Today at [place] gait is Pt amb household distances with RW with reciprocal gait patten MOD I pt did have an antalgic gait pattern secondary to R foot. decreased R HS noted at inital contact and decreased B step length noted. No balance checks noted with amb Per PTA visit 5/2/22 Patient progressed to ambulating 200' outside over uneven surfaces using FWW Supervision to promote increased safety and improved stability using LRAD when ambulating. Patient ambulated with improved step length, but with decreased foot clearance and forward flexed trunk. Gave multiple vc's for pt to lift B foot higher to clear floor safely and to maintain erect posture. Pt verbalized understanding. On [facility] stairs were na. Per PTA visit 4/21/22 Patient progressed to negotiating up and down 4 stairs + 1 using handrail CGA to promote safety when entering and exiting her home in preparation for MD appointments and community ambulation. Pt led with her L LE to go up and down stairs. Pt needed extra time and verbal cues to perform pursed-lip breathing when negotiating stairs Per PTA visit 5/2/22 Patient progressed to negotiating up and down 4 stairs + 1 using handrail Supervision to promote safety when entering and exiting her home in preparation for MD appointments and community ambulation. Pt took her time and demonstrated improved ability to negotiate stairs safely. On [facility] Tinetti 11/28 high fall risk due to reduced strength on BLE, gait deviation and decreased efficiency of balance reactions. Patient demonstrated poor use of hip and ankle strategy during standing balance activity. Patient requires support from AD at all times for safety and stability. TUG 65 seconds. On Re Assessment  pt scored a 18/28 on Tinetti Balance Assessment placing pt as a high fall risk. Pt did have more than a 4 point statistical improvement. Today at [place] TUG is 38. 75 sec Pt has made progress and has met all goals. Discharge plan: Discharge from [facility] services as all goals have been met.   Dr Anahi Pitts has been notified of [place] from HHPT with goals met

## 2022-05-04 NOTE — HOME HEALTH
SUBJECTIVE: Upon arrival pt opened the door with FWW; reported no pain, pt had previously agreed to full sponge bathing but stated she bathed at sink prior to visit and it went well so was only agreeable to mock demo. Pt stated she feels safe bathing and dressing. Pt reported she went home over the weekend and walked up/down 15 steps. Pt demonstrated improved static stance unsupported for 1+ minute and dynamic stance during ADLs with intermittent use of countertop and increased stability. CAREGIVER ASSISTANCE NEEDED FOR: Pt daughter present but did not participate in treatment. MEDICATIONS REVIEWED AND UPDATED: No changes in medications at time of visit. .  OBJECTIVE: see interventions  PATIENT RESPONSE TO TREATMENT:  Pt demonstrated a good response to treatment with no increase in pain and good participation. Marquis Ortiz PATIENT/CAREGIVER EDUCATION PROVIDED THIS VISIT: Pt educated on dressing in bathroom on 3in1 or on EOB to increase stability and reduce SOB with attempting in stance. PATIENT LEVEL OF UNDERSTANDING OF EDUCATION PROVIDED: Pt demonstrated understanding of education by verbalizing she could try to dress in the bathroom and showed improved activity tolerance when using 3:1 for energy conservation during ADL demo. ASSESSMENT AND PROGRESS TOWARD GOALS: Pt demonstrated good progress towards goals with increased safety and I with bathing and UB/LB dressing. Patient will benefit from continued intervention with progression of static standing balance tolerance and IADL assessment to increase I and meet goal of returning home. CONTINUED NEED FOR THE FOLLOWING SKILLS: HH OT is medically necessary to address decreased ROM, decreased strength, impaired bed mobility, decreased independence with functional transfers, decreased I with I/ADLs, and impaired balance in order to improve functional independence, quality of life, return to PLOF, reduce the risk for falls, and reduce pain.   PLAN: Next visit will address light meal prep to increase I and safety in kitchen at [facility] level. DISCHARGE PLANNING DISCUSSED: Discharge to self and family under MD supervision once all goals have been met or patient has reached maximum potential.  Frequency remaininw2 remaining with pt aware of OTDC next week.

## 2022-05-04 NOTE — Clinical Note
Patient is s/p CHF/COPD  and has been treated for  strengthening, gait training, stair training, HEP training, safety training, and balance training. On SOC strength was B hip flexor, extensor, hip abductors, adductors 3+/5, B knee flexor and extensor 3/5. FTSTS 39 seconds. On  Re Assessment 4/27/22  strength is  B hip flexor, quads and hamstrings 5/5  5x sit to stand  29.37 sec. goals has been met. On [facility] bed mobility was Patient needed Neftali x1 with bed mobility  On Re Assessment bed mobility is supine to sit and sit to supine MOD I. goal has been met. On [facility] transfers were Patient needed Mod A x1 using RW to and from bed, chair and toilet . On Re Assessment  transfers are sit to stand from wc x3 MOD I with B UE support MOD , sit to stand from bed x 6 MOD I with and without BUE support for push off. goal has been met. On SOC gait was  Patient ambulated with RW x 20 feet with Mod A x1 with noted slow paced, forward stooped posture and decreased bilateral feet clearance. Patient wa educated with heel to toe gait pattern technique to prevent LOB and falls  On Re Assessment gait is Pt amb with RW with S with reciprocal gait pattern with decresed B step length with decreased B HS with fwd flexed posture. O2 sat was 96% after amb with HR of 86. Per PTA visit 4/25/22  Patient progressed to ambulating 100' inside her home using FWW Supervision to promote increased safety and improved stability using LRAD when ambulating. Patient ambulated with decreased step length, decreased foot clearance, and forward flexed trunk. Gave multiple vc's for pt to take longer steps, to lift B foot higher to clear floor safely, and to maintain erect posture. Pt needs reinforcement for safety with gait, to improve gait, and to improve posture. .Today at [place] gait is Pt amb household distances with RW with reciprocal gait patten MOD I pt did have an antalgic gait pattern secondary to R foot.  decreased R HS noted at inital contact and decreased B step length noted. No balance checks noted with amb Per PTA visit 5/2/22 Patient progressed to ambulating 200' outside over uneven surfaces using FWW Supervision to promote increased safety and improved stability using LRAD when ambulating. Patient ambulated with improved step length, but with decreased foot clearance and forward flexed trunk. Gave multiple vc's for pt to lift B foot higher to clear floor safely and to maintain erect posture. Pt verbalized understanding. On [facility] stairs were na. Per PTA visit 4/21/22 Patient progressed to negotiating up and down 4 stairs + 1 using handrail CGA to promote safety when entering and exiting her home in preparation for MD appointments and community ambulation. Pt led with her L LE to go up and down stairs. Pt needed extra time and verbal cues to perform pursed-lip breathing when negotiating stairs Per PTA visit 5/2/22 Patient progressed to negotiating up and down 4 stairs + 1 using handrail Supervision to promote safety when entering and exiting her home in preparation for MD appointments and community ambulation. Pt took her time and demonstrated improved ability to negotiate stairs safely. On [facility] Tinetti 11/28 high fall risk due to reduced strength on BLE, gait deviation and decreased efficiency of balance reactions. Patient demonstrated poor use of hip and ankle strategy during standing balance activity. Patient requires support from AD at all times for safety and stability. TUG 65 seconds. On Re Assessment  pt scored a 18/28 on Tinetti Balance Assessment placing pt as a high fall risk. Pt did have more than a 4 point statistical improvement. Today at [place] TUG is 38. 75 sec Pt has made progress and has met all goals. Discharge plan: Discharge from [facility] services as all goals have been met.   Dr Randee Gaston has been notified of [place] from [address] with goals met

## 2022-05-04 NOTE — HOME HEALTH
SUBJECTIVE: My R foot my arthritis is bothering me   REQUIRES CAREGIVER ASSISTANCE FOR: transportation, medications, IADLS   MEDICATIONS REVIEWED AND RECONCILED no changes   NEXT MD APPT: ,5/11/22   ROM: B LE WFL   STRENGTH: B hip flexors, quads and hamstrings 5/5   WOUNDS:none  R foot 1 non pitting edema noted no warmth or redness noted   BED MOBILITY:supine to sit and sit to supine MOD I   TRANSFERS:sit to stand from wc x 3  with B UE support MOD I   GAIT: Pt amb household distances with RW with reciprocal gait patten MOD I pt did have an antalgic gait pattern secondary to R foot. decreased R HS noted at inital contact and decreased B step length noted. No balance checks noted with amb Per PTA visit 5/2/22 Patient progressed to ambulating 200' outside over uneven surfaces using FWW Supervision to promote increased safety and improved stability using LRAD when ambulating. Patient ambulated with improved step length, but with decreased foot clearance and forward flexed trunk. Gave multiple vc's for pt to lift B foot higher to clear floor safely and to maintain erect posture. Pt verbalized understanding. STAIRS:Per PTA visit 5/2/22 Patient progressed to negotiating up and down 4 stairs + 1 using handrail Supervision to promote safety when entering and exiting her home in preparation for MD appointments and community ambulation. Pt took her time and demonstrated improved ability to negotiate stairs safely  BALANCE: TUG 38.75 sec   PATIENT RESPONE TO TX: Pt pain level remained the same throughout tx session   PATIENT LEVEL OF UNDERSTANDING OF EDUCATION PROVIDED Pt demonstrated and verbalized use of RW at all times when amb for safety   PATIENT EDUCATION PROVIDED THIS VISIT: safety, HEP, walking, deep breathing,     HEP consisting of:  1. Walking every hour during the daytime for 3-5 minutes using FWW.    2. CHF/COPD Protocol Exercises #1-14 (NOT 13 & 15) x 10 reps each, 3x/day.   Written HEP issued, patient/caregiver verbalized understanding. Patient is s/p CHF/COPD  and has been treated for  strengthening, gait training, stair training, HEP training, safety training, and balance training. On SOC strength was B hip flexor, extensor, hip abductors, adductors 3+/5, B knee flexor and extensor 3/5. FTSTS 39 seconds. On  Re Assessment 4/27/22  strength is  B hip flexor, quads and hamstrings 5/5  5x sit to stand  29.37 sec. goals has been met. On [facility] bed mobility was Patient needed Neftali x1 with bed mobility  On Re Assessment bed mobility is supine to sit and sit to supine MOD I. goal has been met. On [facility] transfers were Patient needed Mod A x1 using RW to and from bed, chair and toilet . On Re Assessment  transfers are sit to stand from wc x3 MOD I with B UE support MOD , sit to stand from bed x 6 MOD I with and without BUE support for push off. goal has been met. On SOC gait was  Patient ambulated with RW x 20 feet with Mod A x1 with noted slow paced, forward stooped posture and decreased bilateral feet clearance. Patient wa educated with heel to toe gait pattern technique to prevent LOB and falls  On Re Assessment gait is Pt amb with RW with S with reciprocal gait pattern with decresed B step length with decreased B HS with fwd flexed posture. O2 sat was 96% after amb with HR of 86. Per PTA visit 4/25/22  Patient progressed to ambulating 100' inside her home using FWW Supervision to promote increased safety and improved stability using LRAD when ambulating. Patient ambulated with decreased step length, decreased foot clearance, and forward flexed trunk. Gave multiple vc's for pt to take longer steps, to lift B foot higher to clear floor safely, and to maintain erect posture. Pt needs reinforcement for safety with gait, to improve gait, and to improve posture. .Today at [place] gait is Pt amb household distances with RW with reciprocal gait patten MOD I pt did have an antalgic gait pattern secondary to R foot. decreased R HS noted at inital contact and decreased B step length noted. No balance checks noted with amb Per PTA visit 5/2/22 Patient progressed to ambulating 200' outside over uneven surfaces using FWW Supervision to promote increased safety and improved stability using LRAD when ambulating. Patient ambulated with improved step length, but with decreased foot clearance and forward flexed trunk. Gave multiple vc's for pt to lift B foot higher to clear floor safely and to maintain erect posture. Pt verbalized understanding. On [facility] stairs were na. Per PTA visit 4/21/22 Patient progressed to negotiating up and down 4 stairs + 1 using handrail CGA to promote safety when entering and exiting her home in preparation for MD appointments and community ambulation. Pt led with her L LE to go up and down stairs. Pt needed extra time and verbal cues to perform pursed-lip breathing when negotiating stairs Per PTA visit 5/2/22 Patient progressed to negotiating up and down 4 stairs + 1 using handrail Supervision to promote safety when entering and exiting her home in preparation for MD appointments and community ambulation. Pt took her time and demonstrated improved ability to negotiate stairs safely. On [facility] Tinetti 11/28 high fall risk due to reduced strength on BLE, gait deviation and decreased efficiency of balance reactions. Patient demonstrated poor use of hip and ankle strategy during standing balance activity. Patient requires support from AD at all times for safety and stability. TUG 65 seconds. On Re Assessment  pt scored a 18/28 on Tinetti Balance Assessment placing pt as a high fall risk. Pt did have more than a 4 point statistical improvement. Today at [place] TUG is 38. 75 sec Pt has made progress and has met all goals. Discharge plan: Discharge from [facility] services as all goals have been met.   Dr Kerri Hauser has been notified of [place] from [address] with goals met

## 2022-05-05 ENCOUNTER — HOME CARE VISIT (OUTPATIENT)
Dept: SCHEDULING | Facility: HOME HEALTH | Age: 82
End: 2022-05-05
Payer: MEDICARE

## 2022-05-05 PROCEDURE — 3331090002 HH PPS REVENUE DEBIT

## 2022-05-05 PROCEDURE — 3331090001 HH PPS REVENUE CREDIT

## 2022-05-05 PROCEDURE — G0158 HHC OT ASSISTANT EA 15: HCPCS

## 2022-05-06 VITALS
DIASTOLIC BLOOD PRESSURE: 72 MMHG | RESPIRATION RATE: 17 BRPM | SYSTOLIC BLOOD PRESSURE: 158 MMHG | OXYGEN SATURATION: 96 % | HEART RATE: 76 BPM | TEMPERATURE: 98.2 F

## 2022-05-06 PROCEDURE — 3331090002 HH PPS REVENUE DEBIT

## 2022-05-06 PROCEDURE — 3331090001 HH PPS REVENUE CREDIT

## 2022-05-06 NOTE — HOME HEALTH
SUBJECTIVE: Upon arrival pt opened door with FWW; pt reported pain 6/10 R foot and some swollen. Pt reported Phyical therapy recommended putting on shoes when walking to help reduce swollen. Therapist educated pt on evaluating feet throughout the day to help reduce swelling. Pt reported bathing at sink is going well, sits on 3:1 when SOB and dressing in bedrooom I with no concerns. During IADLs pt reported SOB and required rest breaks in between tasks stating \" I don't get fatigued I am just short of breathe\" pt appeared fatigued and was again educated on energy conservation techniques for dep breathing. Pt signed [address]  CAREGIVER ASSISTANCE NEEDED FOR: Pt daughter present in home but does not participate in pts treatment  MEDICATIONS REVIEWED AND UPDATED: No changes in medications at time of visit. .  OBJECTIVE: see interventions  PATIENT RESPONSE TO TREATMENT:  Pt demonstrated a positive response to treatment with no increase in pain and good participation. Marlena Nicole PATIENT/CAREGIVER EDUCATION PROVIDED THIS VISIT: Pt educated on walker tray to increase safety and I during meal prep and sitting on 3:1 on days of increased fatigue to increase safety while bathing. PATIENT LEVEL OF UNDERSTANDING OF EDUCATION PROVIDED: Pt demonstrated a good understanding of education verbalizing an interest in walker try and good teachback verbalizing she will sit on 3:1 when she short of breathe to perform bathing. ASSESSMENT AND PROGRESS TOWARD GOALS:  Pt demonstrated good progress towards goals with increasing safety and I with I/ADLs, functional mobility and balance, UB HEP. PLAN: Next visit will be for OTDC, pt on track to meet all goals at this time and verbalized comfortability with CG S for bathing at this time.   DISCHARGE PLANNING DISCUSSED: Discharge to self and family under MD supervision once all goals have been met or patient has reached maximum potential.  Frequency remaininw1 reamaining with pt and CG aware of OTDC next week.

## 2022-05-07 PROCEDURE — 3331090002 HH PPS REVENUE DEBIT

## 2022-05-07 PROCEDURE — 3331090001 HH PPS REVENUE CREDIT

## 2022-05-08 PROCEDURE — 3331090001 HH PPS REVENUE CREDIT

## 2022-05-08 PROCEDURE — 3331090002 HH PPS REVENUE DEBIT

## 2022-05-09 ENCOUNTER — HOME CARE VISIT (OUTPATIENT)
Dept: SCHEDULING | Facility: HOME HEALTH | Age: 82
End: 2022-05-09
Payer: MEDICARE

## 2022-05-09 VITALS
RESPIRATION RATE: 16 BRPM | DIASTOLIC BLOOD PRESSURE: 69 MMHG | HEART RATE: 76 BPM | OXYGEN SATURATION: 95 % | SYSTOLIC BLOOD PRESSURE: 135 MMHG | TEMPERATURE: 98.2 F

## 2022-05-09 PROCEDURE — 3331090002 HH PPS REVENUE DEBIT

## 2022-05-09 PROCEDURE — G0152 HHCP-SERV OF OT,EA 15 MIN: HCPCS

## 2022-05-09 PROCEDURE — 3331090001 HH PPS REVENUE CREDIT

## 2022-05-09 NOTE — HOME HEALTH
SUBJECTIVE: Pt stated \"I'm doing good. I wash myself up and get dressed on my own. My daughter doesn't help me anymore. \" pt answered door use FWW upon OT arrival. pt agreed tx. Anders Mckeon CAREGIVER INVOLVEMENT/ASSISTANCE NEEDED FOR: pt daughter provides A for IADL tasks, shopping, and transportation. [facility] MEDICATIONS: OT reconciled medications with no changes. [facility] HOME HEALTH SUPPLIES BY TYPE AND QUANTITY ORDERED/DELIVERED THIS VISIT INCLUDE: NA  .  OBJECTIVE: See Interventions. Anders Mckeon PATIENT RESONSE TO TREATMENT: pt reports no increased pain or discomfort while performing activity throughout tx. Anders Mckeon PATIENT LEVEL OF UNDERSTANDING OF EDUCATION PROVIDED: pt verbalized good understanding to continue UB HEP, energy conservation, diet and medications as instructed per MD, consult MD or urgent care for medical assistance as opposed to ER unless situation emergent. Anders Mckeon PROGRESS TOWARD GOALS/ASSESSMENT: pt s/p CHF/COPD, Anemia seen for OT 4.18.22 through 5.9.22, total visits of 7 completed, for ADL/IADL training, balance training, transfer training, AE/AD training, energy conservation training, functional strengthening/UB HEP training, and pt/caregiver education to improve functional level and quality of life. pt demonstrated MOD I/I for UB/LB bathing via sponge bath with good safety and technique sitting toilet with I use weight shift technique bringing LE onto opposite LE. pt I for UB, MOD I LB dressing sitting EOB with good safety/technique. pt MOD I for toileting task including clothing management and hygiene as well as I grooming tasks standing bathroom sink. Goals Met. pt static and dynamic standing balance fair while performing all ADL/IADL tasks, ambulation, and transfers. Goal Met. pt MOD I toilet transfer, WC, EOB, and chair transfers with good technique and safety. Goal Met.  pt performs ambulation throughout house MOD I use FWW. pt performs ambulation outdoors over uneven surfaces use FWW S. pt manipulates 4 steps S.   pt performed meal prep with MOD I use FWW demonstrating good safety and technique. Goal Met. pt performed functional tasks standing without rest x 5:34 minutes with 3/10 Modified Sunny level reported this day. Goal Met.  pt able to verbalize and demonstrate I ECT for bathing, dressing, and self-setup. pt able to complete HEP with no rest breaks. Goals Met. pt demonstrates I UB HEP to maintain functional strength and endurance. Goal Met. pt met all goals. D/C OT due to goals met. pt agrees D/C. D/C pt to care of Dr Deisy Ray. .  CONTINUED NEED FOR THE FOLLOWING SKILLS: D/C OT and HHC with goals met. .  THE FOLLOWING DISCHARGE PLANNING WAS DISCUSSED WITH THE PT/CAREGIVER: D/C OT due to goals met. pt agrees D/C. D/C pt to care of Dr Deisy Ray.